# Patient Record
Sex: MALE | Race: BLACK OR AFRICAN AMERICAN | NOT HISPANIC OR LATINO | URBAN - NONMETROPOLITAN AREA
[De-identification: names, ages, dates, MRNs, and addresses within clinical notes are randomized per-mention and may not be internally consistent; named-entity substitution may affect disease eponyms.]

---

## 2019-03-21 ENCOUNTER — IMPORTED ENCOUNTER (OUTPATIENT)
Dept: URBAN - NONMETROPOLITAN AREA CLINIC 1 | Facility: CLINIC | Age: 24
End: 2019-03-21

## 2019-03-21 PROBLEM — H52.13: Noted: 2019-03-21

## 2019-03-21 PROCEDURE — 92015 DETERMINE REFRACTIVE STATE: CPT

## 2019-03-21 PROCEDURE — 92004 COMPRE OPH EXAM NEW PT 1/>: CPT

## 2019-03-21 NOTE — PATIENT DISCUSSION
Myopia OUDiscussed refractive status in detail with patient. New glasses Rx given today. Continue to monitor.

## 2020-05-26 ENCOUNTER — IMPORTED ENCOUNTER (OUTPATIENT)
Dept: URBAN - NONMETROPOLITAN AREA CLINIC 1 | Facility: CLINIC | Age: 25
End: 2020-05-26

## 2020-05-26 PROCEDURE — 92014 COMPRE OPH EXAM EST PT 1/>: CPT

## 2020-05-26 PROCEDURE — 92015 DETERMINE REFRACTIVE STATE: CPT

## 2022-04-10 ASSESSMENT — VISUAL ACUITY
OD_SC: 20/20
OS_SC: 20/20
OD_SC: 20/20-
OS_SC: 20/25

## 2022-04-10 ASSESSMENT — TONOMETRY
OD_IOP_MMHG: 17
OS_IOP_MMHG: 17
OS_IOP_MMHG: 22
OD_IOP_MMHG: 22